# Patient Record
Sex: FEMALE | URBAN - METROPOLITAN AREA
[De-identification: names, ages, dates, MRNs, and addresses within clinical notes are randomized per-mention and may not be internally consistent; named-entity substitution may affect disease eponyms.]

---

## 2021-04-19 ENCOUNTER — APPOINTMENT (RX ONLY)
Dept: URBAN - METROPOLITAN AREA CLINIC 52 | Facility: CLINIC | Age: 61
Setting detail: DERMATOLOGY
End: 2021-04-19

## 2021-04-19 VITALS — TEMPERATURE: 95.1 F

## 2021-04-19 DIAGNOSIS — D485 NEOPLASM OF UNCERTAIN BEHAVIOR OF SKIN: ICD-10-CM | Status: INADEQUATELY CONTROLLED

## 2021-04-19 DIAGNOSIS — L82.1 OTHER SEBORRHEIC KERATOSIS: ICD-10-CM

## 2021-04-19 PROBLEM — D48.5 NEOPLASM OF UNCERTAIN BEHAVIOR OF SKIN: Status: ACTIVE | Noted: 2021-04-19

## 2021-04-19 PROCEDURE — ? ADDITIONAL NOTES

## 2021-04-19 PROCEDURE — 11103 TANGNTL BX SKIN EA SEP/ADDL: CPT

## 2021-04-19 PROCEDURE — 11102 TANGNTL BX SKIN SINGLE LES: CPT

## 2021-04-19 PROCEDURE — 99202 OFFICE O/P NEW SF 15 MIN: CPT | Mod: 25

## 2021-04-19 PROCEDURE — ? BIOPSY BY SHAVE METHOD

## 2021-04-19 PROCEDURE — ? COUNSELING

## 2021-04-19 ASSESSMENT — LOCATION DETAILED DESCRIPTION DERM
LOCATION DETAILED: RIGHT SUPERIOR LATERAL MALAR CHEEK
LOCATION DETAILED: LEFT SUPERIOR MEDIAL FOREHEAD
LOCATION DETAILED: LEFT LATERAL DISTAL PRETIBIAL REGION
LOCATION DETAILED: RIGHT CENTRAL MALAR CHEEK

## 2021-04-19 ASSESSMENT — LOCATION SIMPLE DESCRIPTION DERM
LOCATION SIMPLE: LEFT PRETIBIAL REGION
LOCATION SIMPLE: RIGHT CHEEK
LOCATION SIMPLE: LEFT FOREHEAD

## 2021-04-19 ASSESSMENT — LOCATION ZONE DERM
LOCATION ZONE: FACE
LOCATION ZONE: LEG

## 2021-04-19 NOTE — PROCEDURE: BIOPSY BY SHAVE METHOD
Detail Level: Detailed
Depth Of Biopsy: dermis
Was A Bandage Applied: Yes
Size Of Lesion In Cm: 0.8
X Size Of Lesion In Cm: 0
Biopsy Type: H and E
Biopsy Method: Dermablade
Anesthesia Type: 1% lidocaine with epinephrine
Anesthesia Volume In Cc (Will Not Render If 0): 0.5
Hemostasis: Drysol
Wound Care: Petrolatum
Dressing: bandage
Destruction After The Procedure: No
Type Of Destruction Used: Curettage
Curettage Text: The wound bed was treated with curettage after the biopsy was performed.
Cryotherapy Text: The wound bed was treated with cryotherapy after the biopsy was performed.
Electrodesiccation Text: The wound bed was treated with electrodesiccation after the biopsy was performed.
Electrodesiccation And Curettage Text: The wound bed was treated with electrodesiccation and curettage after the biopsy was performed.
Silver Nitrate Text: The wound bed was treated with silver nitrate after the biopsy was performed.
Lab: -7
Lab Facility: [id number]
Consent: Written consent was obtained and risks were reviewed including but not limited to scarring, infection, bleeding, scabbing, incomplete removal, nerve damage and allergy to anesthesia.
Post-Care Instructions: I reviewed with the patient in detail post-care instructions. Patient is to keep the biopsy site dry overnight, and then apply bacitracin twice daily until healed. Patient may apply hydrogen peroxide soaks to remove any crusting.
Notification Instructions: Patient will be notified of biopsy results. However, patient instructed to call the office if not contacted within 2 weeks.
Billing Type: Third-Party Bill
Information: Selecting Yes will display possible errors in your note based on the variables you have selected. This validation is only offered as a suggestion for you. PLEASE NOTE THAT THE VALIDATION TEXT WILL BE REMOVED WHEN YOU FINALIZE YOUR NOTE. IF YOU WANT TO FAX A PRELIMINARY NOTE YOU WILL NEED TO TOGGLE THIS TO 'NO' IF YOU DO NOT WANT IT IN YOUR FAXED NOTE.
Size Of Lesion In Cm: 1

## 2021-05-17 ENCOUNTER — APPOINTMENT (RX ONLY)
Dept: URBAN - METROPOLITAN AREA CLINIC 52 | Facility: CLINIC | Age: 61
Setting detail: DERMATOLOGY
End: 2021-05-17

## 2021-05-17 VITALS — TEMPERATURE: 97.4 F

## 2021-05-17 DIAGNOSIS — D18.0 HEMANGIOMA: ICD-10-CM | Status: STABLE

## 2021-05-17 DIAGNOSIS — D485 NEOPLASM OF UNCERTAIN BEHAVIOR OF SKIN: ICD-10-CM | Status: INADEQUATELY CONTROLLED

## 2021-05-17 DIAGNOSIS — D22 MELANOCYTIC NEVI: ICD-10-CM | Status: STABLE

## 2021-05-17 DIAGNOSIS — L82.1 OTHER SEBORRHEIC KERATOSIS: ICD-10-CM

## 2021-05-17 DIAGNOSIS — L81.4 OTHER MELANIN HYPERPIGMENTATION: ICD-10-CM | Status: STABLE

## 2021-05-17 PROBLEM — D48.5 NEOPLASM OF UNCERTAIN BEHAVIOR OF SKIN: Status: ACTIVE | Noted: 2021-05-17

## 2021-05-17 PROBLEM — D22.5 MELANOCYTIC NEVI OF TRUNK: Status: ACTIVE | Noted: 2021-05-17

## 2021-05-17 PROBLEM — D18.01 HEMANGIOMA OF SKIN AND SUBCUTANEOUS TISSUE: Status: ACTIVE | Noted: 2021-05-17

## 2021-05-17 PROCEDURE — ? COUNSELING

## 2021-05-17 PROCEDURE — ? BIOPSY BY SHAVE METHOD

## 2021-05-17 PROCEDURE — 99212 OFFICE O/P EST SF 10 MIN: CPT | Mod: 25

## 2021-05-17 PROCEDURE — 11102 TANGNTL BX SKIN SINGLE LES: CPT

## 2021-05-17 PROCEDURE — ? ADDITIONAL NOTES

## 2021-05-17 PROCEDURE — 11103 TANGNTL BX SKIN EA SEP/ADDL: CPT

## 2021-05-17 ASSESSMENT — LOCATION DETAILED DESCRIPTION DERM
LOCATION DETAILED: LEFT LATERAL ABDOMEN
LOCATION DETAILED: LEFT MEDIAL UPPER BACK
LOCATION DETAILED: RIGHT MEDIAL BREAST 2-3:00 REGION
LOCATION DETAILED: LOWER STERNUM
LOCATION DETAILED: LEFT ANTERIOR DISTAL THIGH
LOCATION DETAILED: POSTERIOR MID-PARIETAL SCALP
LOCATION DETAILED: SUBXIPHOID
LOCATION DETAILED: EPIGASTRIC SKIN

## 2021-05-17 ASSESSMENT — LOCATION ZONE DERM
LOCATION ZONE: LEG
LOCATION ZONE: TRUNK
LOCATION ZONE: SCALP

## 2021-05-17 ASSESSMENT — LOCATION SIMPLE DESCRIPTION DERM
LOCATION SIMPLE: ABDOMEN
LOCATION SIMPLE: LEFT THIGH
LOCATION SIMPLE: POSTERIOR SCALP
LOCATION SIMPLE: RIGHT BREAST
LOCATION SIMPLE: LEFT UPPER BACK
LOCATION SIMPLE: CHEST

## 2021-05-17 NOTE — PROCEDURE: BIOPSY BY SHAVE METHOD
Detail Level: Detailed
Depth Of Biopsy: dermis
Was A Bandage Applied: Yes
Size Of Lesion In Cm: 0.7
X Size Of Lesion In Cm: 0
Biopsy Type: H and E
Biopsy Method: Dermablade
Anesthesia Type: 1% lidocaine with epinephrine
Anesthesia Volume In Cc (Will Not Render If 0): 0.5
Hemostasis: Drysol
Wound Care: Petrolatum
Dressing: bandage
Destruction After The Procedure: No
Type Of Destruction Used: Curettage
Curettage Text: The wound bed was treated with curettage after the biopsy was performed.
Cryotherapy Text: The wound bed was treated with cryotherapy after the biopsy was performed.
Electrodesiccation Text: The wound bed was treated with electrodesiccation after the biopsy was performed.
Electrodesiccation And Curettage Text: The wound bed was treated with electrodesiccation and curettage after the biopsy was performed.
Silver Nitrate Text: The wound bed was treated with silver nitrate after the biopsy was performed.
Lab: -7
Lab Facility: [id number]
Consent: Written consent was obtained and risks were reviewed including but not limited to scarring, infection, bleeding, scabbing, incomplete removal, nerve damage and allergy to anesthesia.
Post-Care Instructions: I reviewed with the patient in detail post-care instructions. Patient is to keep the biopsy site dry overnight, and then apply bacitracin twice daily until healed. Patient may apply hydrogen peroxide soaks to remove any crusting.
Notification Instructions: Patient will be notified of biopsy results. However, patient instructed to call the office if not contacted within 2 weeks.
Billing Type: Third-Party Bill
Information: Selecting Yes will display possible errors in your note based on the variables you have selected. This validation is only offered as a suggestion for you. PLEASE NOTE THAT THE VALIDATION TEXT WILL BE REMOVED WHEN YOU FINALIZE YOUR NOTE. IF YOU WANT TO FAX A PRELIMINARY NOTE YOU WILL NEED TO TOGGLE THIS TO 'NO' IF YOU DO NOT WANT IT IN YOUR FAXED NOTE.
Size Of Lesion In Cm: 0.6

## 2021-06-28 ENCOUNTER — APPOINTMENT (RX ONLY)
Dept: URBAN - METROPOLITAN AREA CLINIC 61 | Facility: CLINIC | Age: 61
Setting detail: DERMATOLOGY
End: 2021-06-28

## 2021-06-28 VITALS — DIASTOLIC BLOOD PRESSURE: 83 MMHG | HEART RATE: 81 BPM | SYSTOLIC BLOOD PRESSURE: 158 MMHG

## 2021-06-28 PROBLEM — C44.329 SQUAMOUS CELL CARCINOMA OF SKIN OF OTHER PARTS OF FACE: Status: ACTIVE | Noted: 2021-06-28

## 2021-06-28 PROBLEM — C44.42 SQUAMOUS CELL CARCINOMA OF SKIN OF SCALP AND NECK: Status: ACTIVE | Noted: 2021-06-28

## 2021-06-28 PROCEDURE — 17311 MOHS 1 STAGE H/N/HF/G: CPT

## 2021-06-28 PROCEDURE — ? REPAIR NOTE

## 2021-06-28 PROCEDURE — ? PRESCRIPTION

## 2021-06-28 PROCEDURE — ? MOHS SURGERY

## 2021-06-28 PROCEDURE — 13121 CMPLX RPR S/A/L 2.6-7.5 CM: CPT

## 2021-06-28 RX ORDER — DOXYCYCLINE 100 MG/1
CAPSULE ORAL BID
Qty: 10 | Refills: 0 | Status: ERX | COMMUNITY
Start: 2021-06-28

## 2021-06-28 RX ADMIN — DOXYCYCLINE 1: 100 CAPSULE ORAL at 00:00

## 2021-07-06 ENCOUNTER — APPOINTMENT (RX ONLY)
Dept: URBAN - METROPOLITAN AREA CLINIC 52 | Facility: CLINIC | Age: 61
Setting detail: DERMATOLOGY
End: 2021-07-06

## 2021-07-06 DIAGNOSIS — D22 MELANOCYTIC NEVI: ICD-10-CM

## 2021-07-06 PROBLEM — D48.5 NEOPLASM OF UNCERTAIN BEHAVIOR OF SKIN: Status: ACTIVE | Noted: 2021-07-06

## 2021-07-06 PROCEDURE — ? EXCISION

## 2021-07-06 PROCEDURE — 12032 INTMD RPR S/A/T/EXT 2.6-7.5: CPT | Mod: 79

## 2021-07-06 PROCEDURE — 11402 EXC TR-EXT B9+MARG 1.1-2 CM: CPT | Mod: 79

## 2021-07-06 PROCEDURE — ? ADDITIONAL NOTES

## 2021-07-06 ASSESSMENT — LOCATION DETAILED DESCRIPTION DERM: LOCATION DETAILED: LEFT LATERAL ABDOMEN

## 2021-07-06 ASSESSMENT — LOCATION ZONE DERM: LOCATION ZONE: TRUNK

## 2021-07-06 ASSESSMENT — LOCATION SIMPLE DESCRIPTION DERM: LOCATION SIMPLE: ABDOMEN

## 2021-07-08 ENCOUNTER — APPOINTMENT (RX ONLY)
Dept: URBAN - METROPOLITAN AREA CLINIC 52 | Facility: CLINIC | Age: 61
Setting detail: DERMATOLOGY
End: 2021-07-08

## 2021-07-08 DIAGNOSIS — D22 MELANOCYTIC NEVI: ICD-10-CM

## 2021-07-08 PROBLEM — D48.5 NEOPLASM OF UNCERTAIN BEHAVIOR OF SKIN: Status: ACTIVE | Noted: 2021-07-08

## 2021-07-08 PROCEDURE — ? EXCISION

## 2021-07-08 PROCEDURE — 11402 EXC TR-EXT B9+MARG 1.1-2 CM: CPT | Mod: 79

## 2021-07-08 PROCEDURE — 12032 INTMD RPR S/A/T/EXT 2.6-7.5: CPT | Mod: 79

## 2021-07-08 ASSESSMENT — LOCATION SIMPLE DESCRIPTION DERM: LOCATION SIMPLE: ABDOMEN

## 2021-07-08 ASSESSMENT — LOCATION ZONE DERM: LOCATION ZONE: TRUNK

## 2021-07-08 ASSESSMENT — LOCATION DETAILED DESCRIPTION DERM: LOCATION DETAILED: EPIGASTRIC SKIN

## 2021-07-12 ENCOUNTER — APPOINTMENT (RX ONLY)
Dept: URBAN - METROPOLITAN AREA CLINIC 52 | Facility: CLINIC | Age: 61
Setting detail: DERMATOLOGY
End: 2021-07-12

## 2021-07-12 DIAGNOSIS — D22 MELANOCYTIC NEVI: ICD-10-CM

## 2021-07-12 PROBLEM — D22.72 MELANOCYTIC NEVI OF LEFT LOWER LIMB, INCLUDING HIP: Status: ACTIVE | Noted: 2021-07-12

## 2021-07-12 PROCEDURE — 11402 EXC TR-EXT B9+MARG 1.1-2 CM: CPT | Mod: 79

## 2021-07-12 PROCEDURE — ? EXCISION

## 2021-07-12 PROCEDURE — 12032 INTMD RPR S/A/T/EXT 2.6-7.5: CPT | Mod: 79

## 2021-07-12 ASSESSMENT — LOCATION SIMPLE DESCRIPTION DERM: LOCATION SIMPLE: LEFT THIGH

## 2021-07-12 ASSESSMENT — LOCATION ZONE DERM: LOCATION ZONE: LEG

## 2021-07-12 ASSESSMENT — LOCATION DETAILED DESCRIPTION DERM: LOCATION DETAILED: LEFT ANTERIOR DISTAL THIGH

## 2021-07-12 NOTE — PROCEDURE: EXCISION
Authored by Resident/PA/NP Complex Repair And Rhombic Flap Text: The defect edges were debeveled with a #15 scalpel blade.  The primary defect was closed partially with a complex linear closure.  Given the location of the remaining defect, shape of the defect and the proximity to free margins a rhombic flap was deemed most appropriate for complete closure of the defect.  Using a sterile surgical marker, an appropriate advancement flap was drawn incorporating the defect and placing the expected incisions within the relaxed skin tension lines where possible.    The area thus outlined was incised deep to adipose tissue with a #15 scalpel blade.  The skin margins were undermined to an appropriate distance in all directions utilizing iris scissors.

## 2021-07-15 ENCOUNTER — APPOINTMENT (RX ONLY)
Dept: URBAN - METROPOLITAN AREA CLINIC 52 | Facility: CLINIC | Age: 61
Setting detail: DERMATOLOGY
End: 2021-07-15

## 2021-07-15 DIAGNOSIS — D22 MELANOCYTIC NEVI: ICD-10-CM

## 2021-07-15 PROBLEM — D48.5 NEOPLASM OF UNCERTAIN BEHAVIOR OF SKIN: Status: ACTIVE | Noted: 2021-07-15

## 2021-07-15 PROCEDURE — 11402 EXC TR-EXT B9+MARG 1.1-2 CM: CPT | Mod: 79

## 2021-07-15 PROCEDURE — 12032 INTMD RPR S/A/T/EXT 2.6-7.5: CPT | Mod: 79

## 2021-07-15 PROCEDURE — ? EXCISION

## 2021-07-15 PROCEDURE — ? ADDITIONAL NOTES

## 2021-07-15 ASSESSMENT — LOCATION ZONE DERM: LOCATION ZONE: TRUNK

## 2021-07-15 ASSESSMENT — LOCATION DETAILED DESCRIPTION DERM: LOCATION DETAILED: LEFT MEDIAL UPPER BACK

## 2021-07-15 ASSESSMENT — LOCATION SIMPLE DESCRIPTION DERM: LOCATION SIMPLE: LEFT UPPER BACK

## 2021-07-15 NOTE — PROCEDURE: EXCISION
You can access the FollowMyHealth Patient Portal offered by [facility] by registering at the following website: [email or web address]. By joining [facility]’s FollowMyHealth portal, you will also be able to view your health information using other applications (apps) compatible with our system. Muscle Hinge Flap Text: The defect edges were debeveled with a #15 scalpel blade.  Given the size, depth and location of the defect and the proximity to free margins a muscle hinge flap was deemed most appropriate.  Using a sterile surgical marker, an appropriate hinge flap was drawn incorporating the defect. The area thus outlined was incised with a #15 scalpel blade.  The skin margins were undermined to an appropriate distance in all directions utilizing iris scissors.

## 2021-07-21 ENCOUNTER — APPOINTMENT (RX ONLY)
Dept: URBAN - METROPOLITAN AREA CLINIC 52 | Facility: CLINIC | Age: 61
Setting detail: DERMATOLOGY
End: 2021-07-21

## 2021-07-21 DIAGNOSIS — Z48.02 ENCOUNTER FOR REMOVAL OF SUTURES: ICD-10-CM

## 2021-07-21 PROCEDURE — ? SUTURE REMOVAL (GLOBAL PERIOD)

## 2021-07-21 ASSESSMENT — LOCATION DETAILED DESCRIPTION DERM
LOCATION DETAILED: LEFT LATERAL ABDOMEN
LOCATION DETAILED: EPIGASTRIC SKIN

## 2021-07-21 ASSESSMENT — LOCATION ZONE DERM: LOCATION ZONE: TRUNK

## 2021-07-21 ASSESSMENT — LOCATION SIMPLE DESCRIPTION DERM: LOCATION SIMPLE: ABDOMEN

## 2021-07-21 NOTE — PROCEDURE: SUTURE REMOVAL (GLOBAL PERIOD)
Detail Level: Detailed
Add 86523 Cpt? (Important Note: In 2017 The Use Of 05204 Is Being Tracked By Cms To Determine Future Global Period Reimbursement For Global Periods): no

## 2021-12-01 NOTE — PROCEDURE: EXCISION
5 Hemigard Intro: Due to skin fragility and wound tension, it was decided to use HEMIGARD adhesive retention suture devices to permit a linear closure. The skin was cleaned and dried for a 6cm distance away from the wound. Excessive hair, if present, was removed to allow for adhesion.

## 2022-03-14 ENCOUNTER — APPOINTMENT (RX ONLY)
Dept: URBAN - METROPOLITAN AREA CLINIC 52 | Facility: CLINIC | Age: 62
Setting detail: DERMATOLOGY
End: 2022-03-14

## 2022-03-14 DIAGNOSIS — D485 NEOPLASM OF UNCERTAIN BEHAVIOR OF SKIN: ICD-10-CM

## 2022-03-14 DIAGNOSIS — L82.1 OTHER SEBORRHEIC KERATOSIS: ICD-10-CM | Status: STABLE

## 2022-03-14 DIAGNOSIS — L85.3 XEROSIS CUTIS: ICD-10-CM | Status: STABLE

## 2022-03-14 DIAGNOSIS — L57.8 OTHER SKIN CHANGES DUE TO CHRONIC EXPOSURE TO NONIONIZING RADIATION: ICD-10-CM | Status: STABLE

## 2022-03-14 DIAGNOSIS — Z85.828 PERSONAL HISTORY OF OTHER MALIGNANT NEOPLASM OF SKIN: ICD-10-CM | Status: STABLE

## 2022-03-14 DIAGNOSIS — D18.0 HEMANGIOMA: ICD-10-CM | Status: STABLE

## 2022-03-14 DIAGNOSIS — D22 MELANOCYTIC NEVI: ICD-10-CM | Status: STABLE

## 2022-03-14 DIAGNOSIS — L81.4 OTHER MELANIN HYPERPIGMENTATION: ICD-10-CM | Status: STABLE

## 2022-03-14 PROBLEM — D48.5 NEOPLASM OF UNCERTAIN BEHAVIOR OF SKIN: Status: ACTIVE | Noted: 2022-03-14

## 2022-03-14 PROBLEM — D18.01 HEMANGIOMA OF SKIN AND SUBCUTANEOUS TISSUE: Status: ACTIVE | Noted: 2022-03-14

## 2022-03-14 PROBLEM — D22.5 MELANOCYTIC NEVI OF TRUNK: Status: ACTIVE | Noted: 2022-03-14

## 2022-03-14 PROCEDURE — ? PRESCRIPTION

## 2022-03-14 PROCEDURE — ? SUNSCREEN RECOMMENDATIONS

## 2022-03-14 PROCEDURE — 99214 OFFICE O/P EST MOD 30 MIN: CPT | Mod: 25

## 2022-03-14 PROCEDURE — 11102 TANGNTL BX SKIN SINGLE LES: CPT

## 2022-03-14 PROCEDURE — ? TREATMENT REGIMEN

## 2022-03-14 PROCEDURE — 11103 TANGNTL BX SKIN EA SEP/ADDL: CPT

## 2022-03-14 PROCEDURE — ? SUNSCREEN TREATMENT REGIMEN

## 2022-03-14 PROCEDURE — ? FULL BODY SKIN EXAM

## 2022-03-14 PROCEDURE — ? BIOPSY BY SHAVE METHOD

## 2022-03-14 PROCEDURE — ? ADDITIONAL NOTES

## 2022-03-14 PROCEDURE — ? COUNSELING

## 2022-03-14 RX ORDER — MUPIROCIN 20 MG/G
OINTMENT TOPICAL BID
Qty: 22 | Refills: 0 | Status: ERX | COMMUNITY
Start: 2022-03-14

## 2022-03-14 RX ADMIN — MUPIROCIN 1: 20 OINTMENT TOPICAL at 00:00

## 2022-03-14 ASSESSMENT — LOCATION SIMPLE DESCRIPTION DERM
LOCATION SIMPLE: RIGHT PRETIBIAL REGION
LOCATION SIMPLE: RIGHT POSTERIOR UPPER ARM
LOCATION SIMPLE: RIGHT FOREHEAD
LOCATION SIMPLE: RIGHT UPPER BACK
LOCATION SIMPLE: UPPER BACK
LOCATION SIMPLE: LEFT PRETIBIAL REGION
LOCATION SIMPLE: LEFT LOWER BACK
LOCATION SIMPLE: ABDOMEN

## 2022-03-14 ASSESSMENT — LOCATION DETAILED DESCRIPTION DERM
LOCATION DETAILED: SUPERIOR THORACIC SPINE
LOCATION DETAILED: EPIGASTRIC SKIN
LOCATION DETAILED: LEFT PROXIMAL PRETIBIAL REGION
LOCATION DETAILED: LEFT INFERIOR LATERAL MIDBACK
LOCATION DETAILED: RIGHT SUPERIOR FOREHEAD
LOCATION DETAILED: RIGHT PROXIMAL PRETIBIAL REGION
LOCATION DETAILED: RIGHT MID-UPPER BACK
LOCATION DETAILED: RIGHT SUPERIOR UPPER BACK
LOCATION DETAILED: RIGHT PROXIMAL POSTERIOR UPPER ARM

## 2022-03-14 ASSESSMENT — LOCATION ZONE DERM
LOCATION ZONE: LEG
LOCATION ZONE: TRUNK
LOCATION ZONE: FACE
LOCATION ZONE: ARM

## 2022-03-14 NOTE — PROCEDURE: BIOPSY BY SHAVE METHOD
Detail Level: Detailed
Depth Of Biopsy: dermis
Was A Bandage Applied: Yes
Size Of Lesion In Cm: 1.1
X Size Of Lesion In Cm: 0
Biopsy Type: H and E
Biopsy Method: Personna blade
Anesthesia Type: 1% lidocaine with epinephrine
Anesthesia Volume In Cc (Will Not Render If 0): 0.5
Hemostasis: Krystal's
Wound Care: Petrolatum
Dressing: bandage
Destruction After The Procedure: No
Type Of Destruction Used: Curettage
Curettage Text: The wound bed was treated with curettage after the biopsy was performed.
Cryotherapy Text: The wound bed was treated with cryotherapy after the biopsy was performed.
Electrodesiccation Text: The wound bed was treated with electrodesiccation after the biopsy was performed.
Electrodesiccation And Curettage Text: The wound bed was treated with electrodesiccation and curettage after the biopsy was performed.
Silver Nitrate Text: The wound bed was treated with silver nitrate after the biopsy was performed.
Lab: -7
Lab Facility: [id number]
Consent: Written consent was obtained and risks were reviewed including but not limited to scarring, infection, bleeding, scabbing, incomplete removal, nerve damage and allergy to anesthesia.
Post-Care Instructions: I reviewed with the patient in detail post-care instructions. Patient to keep bandaid on for 24 hours. Then remove, wash with soap and water and apply vaseline twice daily until healed.
Notification Instructions: Patient will be notified of biopsy results. However, patient instructed to call the office if not contacted within 2 weeks.
Billing Type: Third-Party Bill
Information: Selecting Yes will display possible errors in your note based on the variables you have selected. This validation is only offered as a suggestion for you. PLEASE NOTE THAT THE VALIDATION TEXT WILL BE REMOVED WHEN YOU FINALIZE YOUR NOTE. IF YOU WANT TO FAX A PRELIMINARY NOTE YOU WILL NEED TO TOGGLE THIS TO 'NO' IF YOU DO NOT WANT IT IN YOUR FAXED NOTE.
Size Of Lesion In Cm: 0.6

## 2022-03-22 NOTE — PROCEDURE: EXCISION
Island Pedicle Flap With Canthal Suspension Text: The defect edges were debeveled with a #15 scalpel blade.  Given the location of the defect, shape of the defect and the proximity to free margins an island pedicle advancement flap was deemed most appropriate.  Using a sterile surgical marker, an appropriate advancement flap was drawn incorporating the defect, outlining the appropriate donor tissue and placing the expected incisions within the relaxed skin tension lines where possible. The area thus outlined was incised deep to adipose tissue with a #15 scalpel blade.  The skin margins were undermined to an appropriate distance in all directions around the primary defect and laterally outward around the island pedicle utilizing iris scissors.  There was minimal undermining beneath the pedicle flap. A suspension suture was placed in the canthal tendon to prevent tension and prevent ectropion. Hydroxyzine Counseling: Patient advised that the medication is sedating and not to drive a car after taking this medication.  Patient informed of potential adverse effects including but not limited to dry mouth, urinary retention, and blurry vision.  The patient verbalized understanding of the proper use and possible adverse effects of hydroxyzine.  All of the patient's questions and concerns were addressed.

## 2022-04-12 ENCOUNTER — APPOINTMENT (RX ONLY)
Dept: URBAN - METROPOLITAN AREA CLINIC 52 | Facility: CLINIC | Age: 62
Setting detail: DERMATOLOGY
End: 2022-04-12

## 2022-04-12 PROBLEM — C43.59 MALIGNANT MELANOMA OF OTHER PART OF TRUNK: Status: ACTIVE | Noted: 2022-04-12

## 2022-04-12 PROCEDURE — ? EXCISION

## 2022-04-12 PROCEDURE — 13101 CMPLX RPR TRUNK 2.6-7.5 CM: CPT

## 2022-04-12 PROCEDURE — 11603 EXC TR-EXT MAL+MARG 2.1-3 CM: CPT

## 2022-04-12 PROCEDURE — ? ADDITIONAL NOTES

## 2022-04-12 PROCEDURE — 13102 CMPLX RPR TRUNK ADDL 5CM/<: CPT

## 2022-04-18 ENCOUNTER — APPOINTMENT (RX ONLY)
Dept: URBAN - METROPOLITAN AREA CLINIC 52 | Facility: CLINIC | Age: 62
Setting detail: DERMATOLOGY
End: 2022-04-18

## 2022-04-18 PROBLEM — C44.612 BASAL CELL CARCINOMA OF SKIN OF RIGHT UPPER LIMB, INCLUDING SHOULDER: Status: ACTIVE | Noted: 2022-04-18

## 2022-04-18 PROCEDURE — 13121 CMPLX RPR S/A/L 2.6-7.5 CM: CPT | Mod: 79

## 2022-04-18 PROCEDURE — ? EXCISION

## 2022-04-18 PROCEDURE — 11602 EXC TR-EXT MAL+MARG 1.1-2 CM: CPT | Mod: 79

## 2022-04-18 NOTE — PROCEDURE: EXCISION
Recommended weight and BMI control through healthy diet and exercise, green leafy veggies, UV protection, and not smoking. Reviewed the importance of daily monitoring of the vision in each eye independently, along with the use of the Amsler grid daily and instructed patient to call and return immediately for any new changes in their vision or on the Amsler grid. Patient instructed on the importance of regular follow up and monitoring for the early detection of conversion to wet AMD as early detection results in early treatment and better outcomes. Purse String (Simple) Text: Given the location of the defect and the characteristics of the surrounding skin a purse string simple closure was deemed most appropriate.  Undermining was performed circumferentially around the surgical defect.  A purse string suture was then placed and tightened.

## 2022-04-19 ENCOUNTER — APPOINTMENT (RX ONLY)
Dept: URBAN - METROPOLITAN AREA CLINIC 52 | Facility: CLINIC | Age: 62
Setting detail: DERMATOLOGY
End: 2022-04-19

## 2022-04-19 DIAGNOSIS — D485 NEOPLASM OF UNCERTAIN BEHAVIOR OF SKIN: ICD-10-CM

## 2022-04-19 PROBLEM — D48.5 NEOPLASM OF UNCERTAIN BEHAVIOR OF SKIN: Status: ACTIVE | Noted: 2022-04-19

## 2022-04-19 PROCEDURE — 12032 INTMD RPR S/A/T/EXT 2.6-7.5: CPT | Mod: 79

## 2022-04-19 PROCEDURE — ? EXCISION

## 2022-04-19 PROCEDURE — 11402 EXC TR-EXT B9+MARG 1.1-2 CM: CPT | Mod: 79

## 2022-04-19 PROCEDURE — ? ADDITIONAL NOTES

## 2022-04-19 ASSESSMENT — LOCATION DETAILED DESCRIPTION DERM: LOCATION DETAILED: RIGHT SUPERIOR UPPER BACK

## 2022-04-19 ASSESSMENT — LOCATION ZONE DERM: LOCATION ZONE: TRUNK

## 2022-04-19 ASSESSMENT — LOCATION SIMPLE DESCRIPTION DERM: LOCATION SIMPLE: RIGHT UPPER BACK

## 2022-04-19 NOTE — PROCEDURE: EXCISION
5 Tissue Cultured Epidermal Autograft Text: The defect edges were debeveled with a #15 scalpel blade.  Given the location of the defect, shape of the defect and the proximity to free margins a tissue cultured epidermal autograft was deemed most appropriate.  The graft was then trimmed to fit the size of the defect.  The graft was then placed in the primary defect and oriented appropriately.

## 2022-04-19 NOTE — PROCEDURE: EXCISION
Body Location Override (Optional - Billing Will Still Be Based On Selected Body Map Location If Applicable): Right mid upper back

## 2022-07-19 ENCOUNTER — APPOINTMENT (RX ONLY)
Dept: URBAN - METROPOLITAN AREA CLINIC 52 | Facility: CLINIC | Age: 62
Setting detail: DERMATOLOGY
End: 2022-07-19

## 2022-07-19 DIAGNOSIS — L82.1 OTHER SEBORRHEIC KERATOSIS: ICD-10-CM | Status: STABLE

## 2022-07-19 DIAGNOSIS — L57.0 ACTINIC KERATOSIS: ICD-10-CM

## 2022-07-19 DIAGNOSIS — D485 NEOPLASM OF UNCERTAIN BEHAVIOR OF SKIN: ICD-10-CM | Status: INADEQUATELY CONTROLLED

## 2022-07-19 DIAGNOSIS — D18.0 HEMANGIOMA: ICD-10-CM | Status: STABLE

## 2022-07-19 DIAGNOSIS — L57.8 OTHER SKIN CHANGES DUE TO CHRONIC EXPOSURE TO NONIONIZING RADIATION: ICD-10-CM | Status: STABLE

## 2022-07-19 DIAGNOSIS — L81.4 OTHER MELANIN HYPERPIGMENTATION: ICD-10-CM | Status: STABLE

## 2022-07-19 DIAGNOSIS — D22 MELANOCYTIC NEVI: ICD-10-CM | Status: STABLE

## 2022-07-19 DIAGNOSIS — Z85.820 PERSONAL HISTORY OF MALIGNANT MELANOMA OF SKIN: ICD-10-CM | Status: STABLE

## 2022-07-19 DIAGNOSIS — L85.3 XEROSIS CUTIS: ICD-10-CM | Status: INADEQUATELY CONTROLLED

## 2022-07-19 PROBLEM — D48.5 NEOPLASM OF UNCERTAIN BEHAVIOR OF SKIN: Status: ACTIVE | Noted: 2022-07-19

## 2022-07-19 PROBLEM — D22.5 MELANOCYTIC NEVI OF TRUNK: Status: ACTIVE | Noted: 2022-07-19

## 2022-07-19 PROBLEM — D18.01 HEMANGIOMA OF SKIN AND SUBCUTANEOUS TISSUE: Status: ACTIVE | Noted: 2022-07-19

## 2022-07-19 PROCEDURE — ? LIQUID NITROGEN

## 2022-07-19 PROCEDURE — 17000 DESTRUCT PREMALG LESION: CPT | Mod: 59

## 2022-07-19 PROCEDURE — 11102 TANGNTL BX SKIN SINGLE LES: CPT

## 2022-07-19 PROCEDURE — 99214 OFFICE O/P EST MOD 30 MIN: CPT | Mod: 25

## 2022-07-19 PROCEDURE — ? COUNSELING

## 2022-07-19 PROCEDURE — ? TREATMENT REGIMEN

## 2022-07-19 PROCEDURE — 11103 TANGNTL BX SKIN EA SEP/ADDL: CPT

## 2022-07-19 PROCEDURE — ? BIOPSY BY SHAVE METHOD

## 2022-07-19 PROCEDURE — 17003 DESTRUCT PREMALG LES 2-14: CPT

## 2022-07-19 PROCEDURE — ? PRESCRIPTION MEDICATION MANAGEMENT

## 2022-07-19 PROCEDURE — ? FULL BODY SKIN EXAM

## 2022-07-19 PROCEDURE — ? PRESCRIPTION

## 2022-07-19 RX ORDER — MUPIROCIN 20 MG/G
OINTMENT TOPICAL BID
Qty: 22 | Refills: 0 | Status: ERX

## 2022-07-19 ASSESSMENT — LOCATION DETAILED DESCRIPTION DERM
LOCATION DETAILED: LEFT SUPERIOR MEDIAL UPPER BACK
LOCATION DETAILED: SUPERIOR THORACIC SPINE
LOCATION DETAILED: LEFT ANTERIOR SHOULDER
LOCATION DETAILED: LEFT MEDIAL FOREHEAD
LOCATION DETAILED: LEFT CENTRAL MALAR CHEEK

## 2022-07-19 ASSESSMENT — LOCATION SIMPLE DESCRIPTION DERM
LOCATION SIMPLE: LEFT CHEEK
LOCATION SIMPLE: LEFT SHOULDER
LOCATION SIMPLE: UPPER BACK
LOCATION SIMPLE: LEFT FOREHEAD
LOCATION SIMPLE: LEFT UPPER BACK

## 2022-07-19 ASSESSMENT — LOCATION ZONE DERM
LOCATION ZONE: FACE
LOCATION ZONE: TRUNK
LOCATION ZONE: ARM

## 2022-07-19 NOTE — PROCEDURE: PRESCRIPTION MEDICATION MANAGEMENT
Plan: Discussed ammonium lactate 12% lotion QD prescription.
Detail Level: Zone
Render In Strict Bullet Format?: No

## 2022-07-19 NOTE — PROCEDURE: BIOPSY BY SHAVE METHOD
Detail Level: Detailed
Depth Of Biopsy: dermis
Was A Bandage Applied: Yes
Size Of Lesion In Cm: 0
X Size Of Lesion In Cm: 0.4
Biopsy Type: H and E
Biopsy Method: Personna blade
Anesthesia Type: 1% lidocaine with epinephrine
Anesthesia Volume In Cc (Will Not Render If 0): 0.5
Hemostasis: Krystal's
Wound Care: Petrolatum
Dressing: bandage
Destruction After The Procedure: No
Type Of Destruction Used: Curettage
Curettage Text: The wound bed was treated with curettage after the biopsy was performed.
Cryotherapy Text: The wound bed was treated with cryotherapy after the biopsy was performed.
Electrodesiccation Text: The wound bed was treated with electrodesiccation after the biopsy was performed.
Electrodesiccation And Curettage Text: The wound bed was treated with electrodesiccation and curettage after the biopsy was performed.
Silver Nitrate Text: The wound bed was treated with silver nitrate after the biopsy was performed.
Lab: -7
Lab Facility: 3
Consent: Written consent was obtained and risks were reviewed including but not limited to scarring, infection, bleeding, scabbing, incomplete removal, nerve damage and allergy to anesthesia.
Post-Care Instructions: I reviewed with the patient in detail post-care instructions. Patient to keep bandaid on for 24 hours. Then remove, wash with soap and water and apply vaseline twice daily until healed.
Notification Instructions: Patient will be notified of biopsy results. However, patient instructed to call the office if not contacted within 2 weeks.
Billing Type: Third-Party Bill
Information: Selecting Yes will display possible errors in your note based on the variables you have selected. This validation is only offered as a suggestion for you. PLEASE NOTE THAT THE VALIDATION TEXT WILL BE REMOVED WHEN YOU FINALIZE YOUR NOTE. IF YOU WANT TO FAX A PRELIMINARY NOTE YOU WILL NEED TO TOGGLE THIS TO 'NO' IF YOU DO NOT WANT IT IN YOUR FAXED NOTE.
X Size Of Lesion In Cm: 0.6

## 2022-07-19 NOTE — PROCEDURE: LIQUID NITROGEN
Consent: The patient's consent was obtained including but not limited to risks of crusting, scabbing, blistering, scarring, darker or lighter pigmentary change, recurrence, incomplete removal and infection.
Show Aperture Variable?: Yes
Detail Level: Zone
Duration Of Freeze Thaw-Cycle (Seconds): 3
Post-Care Instructions: I reviewed with the patient in detail post-care instructions. Patient is to wear sunprotection, and avoid picking at any of the treated lesions. Pt may apply Vaseline to crusted or scabbing areas.
Number Of Freeze-Thaw Cycles: 3 freeze-thaw cycles
Render Post-Care Instructions In Note?: no

## 2022-07-20 NOTE — PROCEDURE: EXCISION
Niacinamide Pregnancy And Lactation Text: These medications are considered safe during pregnancy. Peng Advancement Flap Text: The defect edges were debeveled with a #15 scalpel blade.  Given the location of the defect, shape of the defect and the proximity to free margins a Peng advancement flap was deemed most appropriate.  Using a sterile surgical marker, an appropriate advancement flap was drawn incorporating the defect and placing the expected incisions within the relaxed skin tension lines where possible. The area thus outlined was incised deep to adipose tissue with a #15 scalpel blade.  The skin margins were undermined to an appropriate distance in all directions utilizing iris scissors.

## 2022-08-03 NOTE — PROCEDURE: MOHS SURGERY
Patient presently talking to the phone with a family member from the phone of male who is visiting the patient       Leona Raygoza RN  08/02/22 [id number] Paramedian Forehead Flap Text: A decision was made to reconstruct the defect utilizing an interpolation axial flap and a staged reconstruction.  A telfa template was made of the defect.  This telfa template was then used to outline the paramedian forehead pedicle flap.  The donor area for the pedicle flap was then injected with anesthesia.  The flap was excised through the skin and subcutaneous tissue down to the layer of the underlying musculature.  The pedicle flap was carefully excised within this deep plane to maintain its blood supply.  The edges of the donor site were undermined.   The donor site was closed in a primary fashion.  The pedicle was then rotated into position and sutured.  Once the tube was sutured into place, adequate blood supply was confirmed with blanching and refill.  The pedicle was then wrapped with xeroform gauze and dressed appropriately with a telfa and gauze bandage to ensure continued blood supply and protect the attached pedicle.

## 2022-08-23 ENCOUNTER — APPOINTMENT (RX ONLY)
Dept: URBAN - METROPOLITAN AREA CLINIC 52 | Facility: CLINIC | Age: 62
Setting detail: DERMATOLOGY
End: 2022-08-23

## 2022-08-23 DIAGNOSIS — D22 MELANOCYTIC NEVI: ICD-10-CM

## 2022-08-23 PROBLEM — D22.62 MELANOCYTIC NEVI OF LEFT UPPER LIMB, INCLUDING SHOULDER: Status: ACTIVE | Noted: 2022-08-23

## 2022-08-23 PROCEDURE — 13121 CMPLX RPR S/A/L 2.6-7.5 CM: CPT

## 2022-08-23 PROCEDURE — ? ADDITIONAL NOTES

## 2022-08-23 PROCEDURE — 11402 EXC TR-EXT B9+MARG 1.1-2 CM: CPT

## 2022-08-23 PROCEDURE — ? EXCISION

## 2022-08-23 ASSESSMENT — LOCATION ZONE DERM: LOCATION ZONE: ARM

## 2022-08-23 ASSESSMENT — LOCATION DETAILED DESCRIPTION DERM: LOCATION DETAILED: LEFT ANTERIOR SHOULDER

## 2022-08-23 ASSESSMENT — LOCATION SIMPLE DESCRIPTION DERM: LOCATION SIMPLE: LEFT SHOULDER

## 2022-08-23 NOTE — PROCEDURE: EXCISION
Home No Repair - Repaired With Adjacent Surgical Defect Text (Leave Blank If You Do Not Want): After the excision the defect was repaired concurrently with another surgical defect which was in close approximation.

## 2022-08-23 NOTE — PROCEDURE: EXCISION
Social Work/Discharge Planning:  Met with patient and completed initial assessment. Explained Social Work role and discussed transition of care/discharge planning. She lives with her significant other in a two story house but she stays on the first floor of her home. PTA she uses a cane. She has a glucometer and shower chair. She denies any history with c or snf. Patient is seen by NP at [facility] and pharmacy is [facility]. She plans to return home at discharge and denies any need at this time. Will continue to follow and assist with discharge planning.  Electronically signed by PRIETO Sutton on 7/22/2022 at 10:00 AM Estlander Flap (Upper To Lower Lip) Text: The defect of the lower lip was assessed and measured.  Given the location and size of the defect, an Estlander flap was deemed most appropriate.  Using a sterile surgical marker, an appropriate Estlander flap was measured and drawn on the upper lip. Local anesthesia was then infiltrated. A scalpel was then used to incise the lateral aspect of the flap, through skin, muscle and mucosa, leaving the flap pedicled medially.  The flap was then rotated and positioned to fill the lower lip defect.  The flap was then sutured into place with a three layer technique, closing the orbicularis oris muscle layer with subcutaneous buried sutures, followed by a mucosal layer and an epidermal layer.

## 2022-08-29 ENCOUNTER — APPOINTMENT (RX ONLY)
Dept: URBAN - METROPOLITAN AREA CLINIC 52 | Facility: CLINIC | Age: 62
Setting detail: DERMATOLOGY
End: 2022-08-29

## 2022-08-29 DIAGNOSIS — D22 MELANOCYTIC NEVI: ICD-10-CM

## 2022-08-29 PROBLEM — D22.5 MELANOCYTIC NEVI OF TRUNK: Status: ACTIVE | Noted: 2022-08-29

## 2022-08-29 PROCEDURE — 11402 EXC TR-EXT B9+MARG 1.1-2 CM: CPT | Mod: 79

## 2022-08-29 PROCEDURE — 13101 CMPLX RPR TRUNK 2.6-7.5 CM: CPT | Mod: 79

## 2022-08-29 PROCEDURE — ? EXCISION

## 2022-08-29 ASSESSMENT — LOCATION SIMPLE DESCRIPTION DERM: LOCATION SIMPLE: LEFT UPPER BACK

## 2022-08-29 ASSESSMENT — LOCATION ZONE DERM: LOCATION ZONE: TRUNK

## 2022-08-29 ASSESSMENT — LOCATION DETAILED DESCRIPTION DERM: LOCATION DETAILED: LEFT SUPERIOR MEDIAL UPPER BACK

## 2022-08-29 NOTE — PROCEDURE: EXCISION
Consent: Written consent obtained. Risks include but not limited to lid/brow ptosis, bruising, swelling, diplopia, temporary effect, incomplete chemical denervation. Peng Advancement Flap Text: The defect edges were debeveled with a #15 scalpel blade.  Given the location of the defect, shape of the defect and the proximity to free margins a Peng advancement flap was deemed most appropriate.  Using a sterile surgical marker, an appropriate advancement flap was drawn incorporating the defect and placing the expected incisions within the relaxed skin tension lines where possible. The area thus outlined was incised deep to adipose tissue with a #15 scalpel blade.  The skin margins were undermined to an appropriate distance in all directions utilizing iris scissors.

## 2022-08-29 NOTE — PROCEDURE: EXCISION
Received prescription renewal request for amphetamine-dextroamphetamine (Adderall XR) 30 MG 24 hr capsule    Last appt: 04/12/2022  Missed appt(s): NONE  Follow up 3 months   Next appt:  08/25/2022    Verified dosage(s) against:   [x] provider appt note   [x] Other: Medication list/Rx history    Last Rx on 04/12/2022 for 30 day supply-fill date 04/30/2022    Per ePDMP, last dispensed on 05/16/2022, sold 05/18/2022    Is the patient due for refill of this medication(s):   [x]  Yes-pt requesting  []  NO    PDMP review:   [x]  Criteria met. Prescription sent to provider to sign.  []  Criteria NOT MET-      Dermal Autograft Text: The defect edges were debeveled with a #15 scalpel blade.  Given the location of the defect, shape of the defect and the proximity to free margins a dermal autograft was deemed most appropriate.  Using a sterile surgical marker, the primary defect shape was transferred to the donor site. The area thus outlined was incised deep to adipose tissue with a #15 scalpel blade.  The harvested graft was then trimmed of adipose and epidermal tissue until only dermis was left.  The skin graft was then placed in the primary defect and oriented appropriately.

## 2022-12-20 ENCOUNTER — APPOINTMENT (RX ONLY)
Dept: URBAN - METROPOLITAN AREA CLINIC 52 | Facility: CLINIC | Age: 62
Setting detail: DERMATOLOGY
End: 2022-12-20

## 2022-12-20 DIAGNOSIS — L81.4 OTHER MELANIN HYPERPIGMENTATION: ICD-10-CM | Status: STABLE

## 2022-12-20 DIAGNOSIS — L85.3 XEROSIS CUTIS: ICD-10-CM | Status: INADEQUATELY CONTROLLED

## 2022-12-20 DIAGNOSIS — L57.8 OTHER SKIN CHANGES DUE TO CHRONIC EXPOSURE TO NONIONIZING RADIATION: ICD-10-CM | Status: STABLE

## 2022-12-20 DIAGNOSIS — Z85.820 PERSONAL HISTORY OF MALIGNANT MELANOMA OF SKIN: ICD-10-CM | Status: STABLE

## 2022-12-20 DIAGNOSIS — Z85.828 PERSONAL HISTORY OF OTHER MALIGNANT NEOPLASM OF SKIN: ICD-10-CM | Status: STABLE

## 2022-12-20 DIAGNOSIS — L82.1 OTHER SEBORRHEIC KERATOSIS: ICD-10-CM | Status: STABLE

## 2022-12-20 DIAGNOSIS — D18.0 HEMANGIOMA: ICD-10-CM | Status: STABLE

## 2022-12-20 DIAGNOSIS — D22 MELANOCYTIC NEVI: ICD-10-CM | Status: STABLE

## 2022-12-20 PROBLEM — D18.01 HEMANGIOMA OF SKIN AND SUBCUTANEOUS TISSUE: Status: ACTIVE | Noted: 2022-12-20

## 2022-12-20 PROBLEM — D22.5 MELANOCYTIC NEVI OF TRUNK: Status: ACTIVE | Noted: 2022-12-20

## 2022-12-20 PROCEDURE — 99214 OFFICE O/P EST MOD 30 MIN: CPT

## 2022-12-20 PROCEDURE — ? TREATMENT REGIMEN

## 2022-12-20 PROCEDURE — ? COUNSELING

## 2022-12-20 PROCEDURE — ? PRESCRIPTION MEDICATION MANAGEMENT

## 2022-12-20 PROCEDURE — ? FULL BODY SKIN EXAM

## 2022-12-20 ASSESSMENT — LOCATION SIMPLE DESCRIPTION DERM
LOCATION SIMPLE: LEFT PRETIBIAL REGION
LOCATION SIMPLE: UPPER BACK
LOCATION SIMPLE: ABDOMEN
LOCATION SIMPLE: LEFT UPPER BACK
LOCATION SIMPLE: LEFT UPPER ARM
LOCATION SIMPLE: RIGHT UPPER BACK

## 2022-12-20 ASSESSMENT — LOCATION DETAILED DESCRIPTION DERM
LOCATION DETAILED: PERIUMBILICAL SKIN
LOCATION DETAILED: SUPERIOR THORACIC SPINE
LOCATION DETAILED: LEFT DISTAL PRETIBIAL REGION
LOCATION DETAILED: RIGHT INFERIOR UPPER BACK
LOCATION DETAILED: LEFT SUPERIOR MEDIAL UPPER BACK
LOCATION DETAILED: LEFT ANTERIOR PROXIMAL UPPER ARM

## 2022-12-20 ASSESSMENT — LOCATION ZONE DERM
LOCATION ZONE: LEG
LOCATION ZONE: TRUNK
LOCATION ZONE: ARM

## 2023-01-10 NOTE — PROCEDURE: MOHS SURGERY
Reason for call:  Other   Patient called regarding (reason for call): call back  Additional comments: Pt needs to be seen for hospital follow up Hospital Follow Up: [facility], 12/29-01/10: [facility]. Mother would prefer Friday the 13th for an appt with anyone at [facility]. Please contact pts mother. Pt also needs labs and xray.     Phone number to reach patient:  Cell number on file:    Telephone Information:   Mobile [phone]       Best Time:  anytime    Can we leave a detailed message on this number?  YES    Travel screening: Not Applicable     Nostril Rim Text: The closure involved the nostril rim.

## 2023-03-21 ENCOUNTER — APPOINTMENT (RX ONLY)
Dept: URBAN - METROPOLITAN AREA CLINIC 52 | Facility: CLINIC | Age: 63
Setting detail: DERMATOLOGY
End: 2023-03-21

## 2023-03-21 DIAGNOSIS — L57.8 OTHER SKIN CHANGES DUE TO CHRONIC EXPOSURE TO NONIONIZING RADIATION: ICD-10-CM | Status: INADEQUATELY CONTROLLED

## 2023-03-21 DIAGNOSIS — D485 NEOPLASM OF UNCERTAIN BEHAVIOR OF SKIN: ICD-10-CM

## 2023-03-21 DIAGNOSIS — L81.4 OTHER MELANIN HYPERPIGMENTATION: ICD-10-CM | Status: STABLE

## 2023-03-21 DIAGNOSIS — L57.0 ACTINIC KERATOSIS: ICD-10-CM

## 2023-03-21 DIAGNOSIS — Z85.820 PERSONAL HISTORY OF MALIGNANT MELANOMA OF SKIN: ICD-10-CM | Status: STABLE

## 2023-03-21 PROBLEM — D48.5 NEOPLASM OF UNCERTAIN BEHAVIOR OF SKIN: Status: ACTIVE | Noted: 2023-03-21

## 2023-03-21 PROCEDURE — 99213 OFFICE O/P EST LOW 20 MIN: CPT | Mod: 25

## 2023-03-21 PROCEDURE — ? COUNSELING

## 2023-03-21 PROCEDURE — ? LIQUID NITROGEN

## 2023-03-21 PROCEDURE — 11102 TANGNTL BX SKIN SINGLE LES: CPT

## 2023-03-21 PROCEDURE — ? TREATMENT REGIMEN

## 2023-03-21 PROCEDURE — 17000 DESTRUCT PREMALG LESION: CPT | Mod: 59

## 2023-03-21 PROCEDURE — ? BIOPSY BY SHAVE METHOD

## 2023-03-21 ASSESSMENT — LOCATION SIMPLE DESCRIPTION DERM
LOCATION SIMPLE: LEFT FOREARM
LOCATION SIMPLE: RIGHT FOREARM
LOCATION SIMPLE: LEFT WRIST

## 2023-03-21 ASSESSMENT — LOCATION DETAILED DESCRIPTION DERM
LOCATION DETAILED: LEFT PROXIMAL DORSAL FOREARM
LOCATION DETAILED: LEFT DORSAL WRIST
LOCATION DETAILED: RIGHT PROXIMAL RADIAL DORSAL FOREARM
LOCATION DETAILED: LEFT DISTAL DORSAL FOREARM
LOCATION DETAILED: RIGHT DISTAL DORSAL FOREARM
LOCATION DETAILED: RIGHT VENTRAL DISTAL FOREARM

## 2023-03-21 ASSESSMENT — LOCATION ZONE DERM: LOCATION ZONE: ARM

## 2023-03-21 NOTE — PROCEDURE: BIOPSY BY SHAVE METHOD
Detail Level: Detailed
Depth Of Biopsy: dermis
Was A Bandage Applied: Yes
Size Of Lesion In Cm: 0.8
X Size Of Lesion In Cm: 0
Biopsy Type: H and E
Biopsy Method: Personna blade
Anesthesia Type: 1% lidocaine with epinephrine
Anesthesia Volume In Cc (Will Not Render If 0): 0.5
Hemostasis: Krystal's
Wound Care: Petrolatum
Dressing: bandage
Destruction After The Procedure: No
Type Of Destruction Used: Curettage
Curettage Text: The wound bed was treated with curettage after the biopsy was performed.
Cryotherapy Text: The wound bed was treated with cryotherapy after the biopsy was performed.
Electrodesiccation Text: The wound bed was treated with electrodesiccation after the biopsy was performed.
Electrodesiccation And Curettage Text: The wound bed was treated with electrodesiccation and curettage after the biopsy was performed.
Silver Nitrate Text: The wound bed was treated with silver nitrate after the biopsy was performed.
Lab: -7
Lab Facility: 3
Consent: Written consent was obtained and risks were reviewed including but not limited to scarring, infection, bleeding, scabbing, incomplete removal, nerve damage and allergy to anesthesia.
Post-Care Instructions: I reviewed with the patient in detail post-care instructions. Patient to keep bandaid on for 24 hours. Then remove, wash with soap and water and apply vaseline twice daily until healed.
Notification Instructions: Patient will be notified of biopsy results. However, patient instructed to call the office if not contacted within 2 weeks.
Billing Type: Third-Party Bill
Information: Selecting Yes will display possible errors in your note based on the variables you have selected. This validation is only offered as a suggestion for you. PLEASE NOTE THAT THE VALIDATION TEXT WILL BE REMOVED WHEN YOU FINALIZE YOUR NOTE. IF YOU WANT TO FAX A PRELIMINARY NOTE YOU WILL NEED TO TOGGLE THIS TO 'NO' IF YOU DO NOT WANT IT IN YOUR FAXED NOTE.

## 2023-03-21 NOTE — PROCEDURE: LIQUID NITROGEN
Post-Care Instructions: I reviewed with the patient in detail post-care instructions. Patient is to wear sunprotection, and avoid picking at any of the treated lesions. Pt may apply Vaseline to crusted or scabbing areas.
Render Post-Care Instructions In Note?: no
Show Aperture Variable?: Yes
Consent: The patient's consent was obtained including but not limited to risks of crusting, scabbing, blistering, scarring, darker or lighter pigmentary change, recurrence, incomplete removal and infection.
Detail Level: Zone
Duration Of Freeze Thaw-Cycle (Seconds): 3
Number Of Freeze-Thaw Cycles: 3 freeze-thaw cycles

## 2023-04-20 NOTE — PROCEDURE: EXCISION
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 Pt felt like his AICD fired. Pt missed dialysis session yesterday because he was sent to [facility] for dialysis and had a long wait, left AMA. While waiting he slid out of his wheelchair and hit the back of his head and fell on his left hip. Denies recent fevers, chills, cough, sob, abd pain, diarrhea, constipation. Doesn't make urine, has a suprapubic catheter. 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 Pt felt like his AICD fired. Pt missed dialysis session yesterday because he was sent to [facility] for dialysis and had a long wait, left AMA. While waiting he slid out of his wheelchair and hit the back of his head and fell on his left hip. Denies recent fevers, chills, cough, sob, abd pain, diarrhea, constipation. Doesn't make urine, has a suprapubic catheter. 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 Pt felt like his AICD fired x2. Pt missed dialysis session yesterday because he was sent to [facility] for evaluation after slipping out of wheelchair and had a long wait, left AMA. He slid out of his wheelchair and hit the back of his head and fell on his left hip. Denies recent fevers, chills, cough, sob, abd pain, diarrhea, constipation. Doesn't make urine, has a suprapubic catheter. 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, seizures, Afib s/p ablation on AC (Eliquis), CAD s/p PCI, cardiomyopathy (EF 45% in 9/22), AICD in 2018, neurogenic bladder with SPC, COPD/BARRY on O2 at night w/o CPAP, hypothyroidism, anemia,  ESRD on HD (M, W,F) via RIJ P/C, RCC s/p left partial nephrotomy, chronic foot pressure ulcers, wheelchair bound presents from [facility] for left-sided chest pain around 5am this AM described as pressure and felt a jolt that radiated to left-arm and left his left arm feeling numb, a/w weakness and blurry vision for a couple of seconds not a/w nausea, vomiting, sob, diaphoresis. Pt felt like his AICD fired x2. Pt missed dialysis session yesterday because he was sent to [facility] for evaluation after slipping out of wheelchair and had a long wait, left AMA. He slid out of his wheelchair and hit the back of his head and fell on his left hip. Denies recent fevers, chills, cough, sob, abd pain, diarrhea, constipation. Doesn't make urine, has a suprapubic catheter.

## 2023-04-28 ENCOUNTER — APPOINTMENT (RX ONLY)
Dept: URBAN - METROPOLITAN AREA CLINIC 52 | Facility: CLINIC | Age: 63
Setting detail: DERMATOLOGY
End: 2023-04-28

## 2023-04-28 PROBLEM — C44.622 SQUAMOUS CELL CARCINOMA OF SKIN OF RIGHT UPPER LIMB, INCLUDING SHOULDER: Status: ACTIVE | Noted: 2023-04-28

## 2023-04-28 PROCEDURE — 11602 EXC TR-EXT MAL+MARG 1.1-2 CM: CPT

## 2023-04-28 PROCEDURE — ? EXCISION

## 2023-04-28 PROCEDURE — 12032 INTMD RPR S/A/T/EXT 2.6-7.5: CPT

## 2023-04-28 NOTE — PROCEDURE: EXCISION
Arrived to ICU with OETT to mechanical ventilation, propofol sedation  10/23 - remains intubated on mechanical ventilation; oxygen demand decreased; large amount of thick secretions from trach and oral pharynx  10/24 - Intubated, improved aeration of lung  10/25 - self extubated yesterday; self removed midline overnight  10/26 - weaned to room air; continue drainage from wounds and frequent liquid stool contamination required flexiseal system and wound vac application  10/27 - seen and examined on return from OR tracheostomy placement; remains sedated on mechanical ventilation via new #8 shiley trach with obturator at head of bed  10/28 - seen and examined on mechanical ventilation and again after transition to trach collar; wound vac contaminated with liquid stool overnight s/t fecal management system leakage, vac changed by WOC today  10/29 - semi-erect in bed eyes open and awake and alert not following commands suspect at baseline.  On blow by O2 to Trach in no distress with VSS awaiting OR for diverting colostomy and TF held.  10/30 - supine in bed eyes open and awake grimacing with colostomy wound care in no distress with VSS S/P Colostomy yesterday     O-L Flap Text: The defect edges were debeveled with a #15 scalpel blade.  Given the location of the defect, shape of the defect and the proximity to free margins an O-L flap was deemed most appropriate.  Using a sterile surgical marker, an appropriate advancement flap was drawn incorporating the defect and placing the expected incisions within the relaxed skin tension lines where possible.    The area thus outlined was incised deep to adipose tissue with a #15 scalpel blade.  The skin margins were undermined to an appropriate distance in all directions utilizing iris scissors.

## 2023-04-28 NOTE — PROCEDURE: EXCISION
Initial (On Arrival) Posterior Auricular Interpolation Flap Text: A decision was made to reconstruct the defect utilizing an interpolation axial flap and a staged reconstruction.  A telfa template was made of the defect.  This telfa template was then used to outline the posterior auricular interpolation flap.  The donor area for the pedicle flap was then injected with anesthesia.  The flap was excised through the skin and subcutaneous tissue down to the layer of the underlying musculature.  The pedicle flap was carefully excised within this deep plane to maintain its blood supply.  The edges of the donor site were undermined.   The donor site was closed in a primary fashion.  The pedicle was then rotated into position and sutured.  Once the tube was sutured into place, adequate blood supply was confirmed with blanching and refill.  The pedicle was then wrapped with xeroform gauze and dressed appropriately with a telfa and gauze bandage to ensure continued blood supply and protect the attached pedicle.

## 2024-05-16 ENCOUNTER — APPOINTMENT (RX ONLY)
Dept: URBAN - METROPOLITAN AREA CLINIC 52 | Facility: CLINIC | Age: 64
Setting detail: DERMATOLOGY
End: 2024-05-16

## 2024-05-16 DIAGNOSIS — L82.1 OTHER SEBORRHEIC KERATOSIS: ICD-10-CM

## 2024-05-16 PROBLEM — D48.5 NEOPLASM OF UNCERTAIN BEHAVIOR OF SKIN: Status: ACTIVE | Noted: 2024-05-16

## 2024-05-16 PROCEDURE — ? BIOPSY BY SHAVE METHOD

## 2024-05-16 PROCEDURE — ? COUNSELING

## 2024-05-16 PROCEDURE — ? BENIGN DESTRUCTION COSMETIC

## 2024-05-16 PROCEDURE — 11102 TANGNTL BX SKIN SINGLE LES: CPT

## 2024-05-16 PROCEDURE — ? ADDITIONAL NOTES

## 2024-05-16 ASSESSMENT — LOCATION SIMPLE DESCRIPTION DERM
LOCATION SIMPLE: LEFT FOREHEAD
LOCATION SIMPLE: RIGHT EYEBROW
LOCATION SIMPLE: RIGHT ZYGOMA
LOCATION SIMPLE: SUPERIOR FOREHEAD
LOCATION SIMPLE: LEFT CHEEK
LOCATION SIMPLE: RIGHT SCALP

## 2024-05-16 ASSESSMENT — LOCATION DETAILED DESCRIPTION DERM
LOCATION DETAILED: RIGHT CENTRAL ZYGOMA
LOCATION DETAILED: SUPERIOR MID FOREHEAD
LOCATION DETAILED: RIGHT MEDIAL FRONTAL SCALP
LOCATION DETAILED: LEFT INFERIOR CENTRAL MALAR CHEEK
LOCATION DETAILED: LEFT LATERAL MALAR CHEEK
LOCATION DETAILED: RIGHT CENTRAL EYEBROW
LOCATION DETAILED: LEFT SUPERIOR MEDIAL FOREHEAD

## 2024-05-16 ASSESSMENT — LOCATION ZONE DERM
LOCATION ZONE: SCALP
LOCATION ZONE: FACE

## 2024-05-16 NOTE — PROCEDURE: BENIGN DESTRUCTION COSMETIC
Detail Level: Detailed
Consent: The patient's consent was obtained including but not limited to risks of crusting, scabbing, blistering, scarring, darker or lighter pigmentary change, recurrence, incomplete removal and infection.
Post-Care Instructions: I reviewed with the patient in detail post-care instructions. Patient is to wear sunprotection, and avoid picking at any of the treated lesions. Pt may apply Vaseline to crusted or scabbing areas.
Price (Use Numbers Only, No Special Characters Or $): 105
Anesthesia Volume In Cc: 0.5

## 2024-05-16 NOTE — PROCEDURE: BIOPSY BY SHAVE METHOD
Detail Level: Detailed
Depth Of Biopsy: dermis
Was A Bandage Applied: Yes
Size Of Lesion In Cm: 0.6
X Size Of Lesion In Cm: 0
Biopsy Type: H and E
Biopsy Method: Dermablade
Anesthesia Type: 1% lidocaine with epinephrine
Anesthesia Volume In Cc: 0.5
Hemostasis: Krystal's
Wound Care: Aquaphor
Dressing: bandage
Destruction After The Procedure: No
Type Of Destruction Used: Curettage
Curettage Text: The wound bed was treated with curettage after the biopsy was performed.
Cryotherapy Text: The wound bed was treated with cryotherapy after the biopsy was performed.
Electrodesiccation Text: The wound bed was treated with electrodesiccation after the biopsy was performed.
Electrodesiccation And Curettage Text: The wound bed was treated with electrodesiccation and curettage after the biopsy was performed.
Silver Nitrate Text: The wound bed was treated with silver nitrate after the biopsy was performed.
Lab: -[id number]
Lab Facility: [id number]
Consent: Written consent was obtained and risks were reviewed including but not limited to scarring, infection, bleeding, scabbing, incomplete removal, nerve damage and allergy to anesthesia.
Post-Care Instructions: I reviewed with the patient in detail post-care instructions. Patient is to keep the biopsy site dry overnight, and then apply bacitracin twice daily until healed. Patient may apply hydrogen peroxide soaks to remove any crusting.
Notification Instructions: Patient will be notified of biopsy results. However, patient instructed to call the office if not contacted within 2 weeks.
Billing Type: Third-Party Bill
Information: Selecting Yes will display possible errors in your note based on the variables you have selected. This validation is only offered as a suggestion for you. PLEASE NOTE THAT THE VALIDATION TEXT WILL BE REMOVED WHEN YOU FINALIZE YOUR NOTE. IF YOU WANT TO FAX A PRELIMINARY NOTE YOU WILL NEED TO TOGGLE THIS TO 'NO' IF YOU DO NOT WANT IT IN YOUR FAXED NOTE.

## 2024-05-16 NOTE — PROCEDURE: ADDITIONAL NOTES
Render Risk Assessment In Note?: no
Detail Level: Simple
Additional Notes: Patient aware to go to ophthalmologist for treatment if removal is desired

## 2024-05-29 ENCOUNTER — APPOINTMENT (RX ONLY)
Dept: URBAN - METROPOLITAN AREA CLINIC 52 | Facility: CLINIC | Age: 64
Setting detail: DERMATOLOGY
End: 2024-05-29

## 2024-05-29 DIAGNOSIS — L57.0 ACTINIC KERATOSIS: ICD-10-CM

## 2024-05-29 PROCEDURE — 17000 DESTRUCT PREMALG LESION: CPT

## 2024-05-29 PROCEDURE — ? LIQUID NITROGEN

## 2024-05-29 ASSESSMENT — LOCATION DETAILED DESCRIPTION DERM: LOCATION DETAILED: LEFT INFERIOR MEDIAL MALAR CHEEK

## 2024-05-29 ASSESSMENT — LOCATION SIMPLE DESCRIPTION DERM: LOCATION SIMPLE: LEFT CHEEK

## 2024-05-29 ASSESSMENT — LOCATION ZONE DERM: LOCATION ZONE: FACE

## 2024-05-29 NOTE — PROCEDURE: LIQUID NITROGEN
Consent: The patient's consent was obtained including but not limited to risks of crusting, scabbing, blistering, scarring, darker or lighter pigmentary change, recurrence, incomplete removal and infection.
Post-Care Instructions: I reviewed with the patient in detail post-care instructions. Patient is to wear sunprotection, and avoid picking at any of the treated lesions. Pt may apply Vaseline to crusted or scabbing areas.
Detail Level: Simple
Show Aperture Variable?: Yes
Render Note In Bullet Format When Appropriate: No
Duration Of Freeze Thaw-Cycle (Seconds): 2
Number Of Freeze-Thaw Cycles: 2 freeze-thaw cycles
Application Tool (Optional): Liquid Nitrogen Sprayer

## 2024-07-12 NOTE — PROCEDURE: EXCISION
07-11-24 @ 07:01  -  07-12-24 @ 07:00  --------------------------------------------------------  OUT:    Ileostomy (mL): 2000 mL  Total OUT: 2000 mL    Total NET: -2000 mL      07-12-24 @ 07:01  -  07-12-24 @ 12:59  --------------------------------------------------------  OUT:    Ileostomy (mL): 500 mL  Total OUT: 500 mL    Total NET: -500 mL       A-T Advancement Flap Text: The defect edges were debeveled with a #15 scalpel blade.  Given the location of the defect, shape of the defect and the proximity to free margins an A-T advancement flap was deemed most appropriate.  Using a sterile surgical marker, an appropriate advancement flap was drawn incorporating the defect and placing the expected incisions within the relaxed skin tension lines where possible.    The area thus outlined was incised deep to adipose tissue with a #15 scalpel blade.  The skin margins were undermined to an appropriate distance in all directions utilizing iris scissors.

## 2024-10-15 NOTE — PROCEDURE: EXCISION
Additional History: She was taking terbinafine for nail fungus. She states the terbinafine helped with nail growth. Suturegard Retention Suture: 2-0 Nylon

## 2024-11-27 ENCOUNTER — APPOINTMENT (RX ONLY)
Dept: URBAN - METROPOLITAN AREA CLINIC 52 | Facility: CLINIC | Age: 64
Setting detail: DERMATOLOGY
End: 2024-11-27

## 2024-11-27 DIAGNOSIS — Z85.820 PERSONAL HISTORY OF MALIGNANT MELANOMA OF SKIN: ICD-10-CM

## 2024-11-27 DIAGNOSIS — D22 MELANOCYTIC NEVI: ICD-10-CM

## 2024-11-27 DIAGNOSIS — L81.4 OTHER MELANIN HYPERPIGMENTATION: ICD-10-CM

## 2024-11-27 DIAGNOSIS — D18.0 HEMANGIOMA: ICD-10-CM

## 2024-11-27 DIAGNOSIS — Z85.828 PERSONAL HISTORY OF OTHER MALIGNANT NEOPLASM OF SKIN: ICD-10-CM

## 2024-11-27 DIAGNOSIS — L82.1 OTHER SEBORRHEIC KERATOSIS: ICD-10-CM

## 2024-11-27 DIAGNOSIS — L73.8 OTHER SPECIFIED FOLLICULAR DISORDERS: ICD-10-CM | Status: INADEQUATELY CONTROLLED

## 2024-11-27 PROBLEM — D22.5 MELANOCYTIC NEVI OF TRUNK: Status: ACTIVE | Noted: 2024-11-27

## 2024-11-27 PROBLEM — D18.01 HEMANGIOMA OF SKIN AND SUBCUTANEOUS TISSUE: Status: ACTIVE | Noted: 2024-11-27

## 2024-11-27 PROBLEM — D22.39 MELANOCYTIC NEVI OF OTHER PARTS OF FACE: Status: ACTIVE | Noted: 2024-11-27

## 2024-11-27 PROBLEM — D23.71 OTHER BENIGN NEOPLASM OF SKIN OF RIGHT LOWER LIMB, INCLUDING HIP: Status: ACTIVE | Noted: 2024-11-27

## 2024-11-27 PROCEDURE — ? PRESCRIPTION

## 2024-11-27 PROCEDURE — ? PRESCRIPTION MEDICATION MANAGEMENT

## 2024-11-27 PROCEDURE — ? COUNSELING

## 2024-11-27 PROCEDURE — 99213 OFFICE O/P EST LOW 20 MIN: CPT

## 2024-11-27 PROCEDURE — ? TREATMENT REGIMEN

## 2024-11-27 PROCEDURE — ? FULL BODY SKIN EXAM

## 2024-11-27 RX ORDER — CLINDAMYCIN PHOSPHATE 10 MG/ML
SOLUTION TOPICAL BID
Qty: 60 | Refills: 4 | Status: ERX | COMMUNITY
Start: 2024-11-27

## 2024-11-27 RX ADMIN — CLINDAMYCIN PHOSPHATE: 10 SOLUTION TOPICAL at 00:00

## 2024-11-27 ASSESSMENT — LOCATION DETAILED DESCRIPTION DERM
LOCATION DETAILED: RIGHT INFERIOR CENTRAL MALAR CHEEK
LOCATION DETAILED: RIGHT RIB CAGE
LOCATION DETAILED: RIGHT LATERAL SUPERIOR CHEST
LOCATION DETAILED: RIGHT MEDIAL BREAST 2-3:00 REGION
LOCATION DETAILED: LEFT SUPRAPUBIC SKIN
LOCATION DETAILED: RIGHT ANTERIOR SHOULDER

## 2024-11-27 ASSESSMENT — LOCATION SIMPLE DESCRIPTION DERM
LOCATION SIMPLE: RIGHT CHEEK
LOCATION SIMPLE: RIGHT BREAST
LOCATION SIMPLE: RIGHT SHOULDER
LOCATION SIMPLE: GROIN
LOCATION SIMPLE: ABDOMEN
LOCATION SIMPLE: CHEST

## 2024-11-27 ASSESSMENT — LOCATION ZONE DERM
LOCATION ZONE: TRUNK
LOCATION ZONE: ARM
LOCATION ZONE: FACE

## 2024-11-27 NOTE — PROCEDURE: PRESCRIPTION MEDICATION MANAGEMENT
Detail Level: Zone
Render In Strict Bullet Format?: No
Initiate Treatment: clindamycin phosphate 1 % topical swab BID

## 2024-11-27 NOTE — HPI: EVALUATION OF SKIN LESION(S)
Assessment: 65 F PMH Mitral regurgitation, rheumatic heart disease, atrial fibrillation, HTN, on Digoxin, Warfarin, DM 2 on Jardiance presenting with flu like symptoms, tachycardia, and hypotension requiring pressure support. Likely mutlifactorial shock, primarily septic with component of cardiogenic.     Neuro:  Alert and orientated x3 at baseline  No active issues    Cardiovascular:   #Atrial fibrillation with RVR  #Mitral valvue regurgitation  #Mechanical aortic valve replacement  #History of rheumatic heart disease  #Shock, Septic vs cardiogenic  - required pressors likely iso shock, Josep gtt weaned off since 12am  - Lopressor PO BID increased to 25mg TID for persistent Afib RVR  - s/p Lopressor IVP PRN   - TTE from 11/2023 shows The LV cavity is small. LV systolic function is severely decreased with EF 37 %   - TTE 2/20 pending  - Continue Warfarin, INR goal 2.5-3.5   - c/w digoxin every other day  - Digoxin by level, draw in a few days    #HTN  - Hold home Enalapril in the setting of hypotension  #HLD  - Continue Atorvastatin 20 mg daily    Pulmonology  - CXR shows clear lungs  - POCUS shows b/i b lines  - Saturating well on room air, protecting airway    GI  RICHY  # Transaminitis   , , Alk phos and Bili normal   - unknown etiology  - f/u hepatitis panel  - RUQ US    Diet: NPO   - continue to keep NPO until anion gap closes  - bowel regimen        Renal  #Anion gap metabolic acidosis  #Lactic acidosis  #? Ketoacidosis  - S/p 3L IVF  - Monitor VBGs q 12  - Treat underlying cause, possible Euglycemic DKA vs Sepsis   - hyperlactatemia likely iso renal/liver dysfunction metformin vs urosepsis, continue to trend  #LUISA  - Cr from 11/2023 of 0.39  - Admission Cr of .89  - LUISA likely pre-renal in the setting of shock, now slightly downtrending  - Urine electrolytes, UA, Serum osmolality  - Monitor UOP and Cr  - Adequate fluid hydration    ID  #Septic shock in the setting of URI vs UTI    - Complained of burning on urination on 2/15, No CVA tenderness   - leukocytosis 15, Tmax 100.6F  - UA positive, f/u urine culture  - f/u blood culture  - s/p Azithromyin x1, continue Ceftriaxone 1g daily for 14 days (adjust as per cultures)   - RVP negative      Endo  #?Euglycemic DKA  #DM2  pH 7.27 AG 20, Bicarb 15, Glucose 201, Lactate 4  - s/p 4 L IVF  - Pending BHB  - History of type DM2 on Jardiance, now presenting with infection, polyuria, polydipsia  - c/w insulin gtt and VF per DKA protocol.   - Endocrine consult.  - electrolytes repletion with q4 hour BMPs    Heme/Onc  - Continue Warfarin with INR goal of 2.5-3.5   - daily coags    Ethics  Full Code    
Assessment: 65 F PMH Mitral regurgitation, rheumatic heart disease, atrial fibrillation, HTN, on Digoxin, Warfarin, DM 2 on Jardiance presenting with flu like symptoms, tachycardia, and hypotension requiring pressure support. Likely mutlifactorial shock, primarily septic with component of cardiogenic.     Neuro:  Alert and orientated x3 at baseline  No active issues  - currently at AOx3, following commands  - activity- oob to chair    Cardiovascular:   #Atrial fibrillation with RVR  #Mitral valvue regurgitation  #Mechanical aortic valve replacement  #History of rheumatic heart disease  #Shock, Septic vs cardiogenic  - required pressors likely iso shock, Josep gtt weaned off since 12am 2/20  - Remained Afib -170's w/o hypotension despite initiating and uptrending home PO lopressor   - s/p Lopressor IVP PRN- no change in HR/BP, however c/o "not feeling well" and diaphoretic soon after administered IV  - TTE 2/20  shows The LV cavity is small. LV systolic function is severely decreased with EF 37 %   -  - Continue Warfarin, INR goal 2.5-3.5   - c/w digoxin every other day  - Digoxin by level, draw in a few days    #HTN  - Hold home Enalapril in the setting of hypotension  #HLD  - Continue Atorvastatin 20 mg daily    Pulmonology  - CXR shows clear lungs  - POCUS shows b/i b lines  - Saturating well on room air, protecting airway    GI  RICHY  # Transaminitis   , , Alk phos and Bili normal   - unknown etiology  - f/u hepatitis panel  - RUQ US    Diet: NPO   - continue to keep NPO until anion gap closes  - bowel regimen        Renal  #Anion gap metabolic acidosis  #Lactic acidosis  #? Ketoacidosis  - S/p 3L IVF  - Monitor VBGs q 12  - Treat underlying cause, possible Euglycemic DKA vs Sepsis   - hyperlactatemia likely iso renal/liver dysfunction metformin vs urosepsis, continue to trend  #LUISA  - Cr from 11/2023 of 0.39  - Admission Cr of .89  - LUISA likely pre-renal in the setting of shock, now slightly downtrending  - Urine electrolytes, UA, Serum osmolality  - Monitor UOP and Cr  - Adequate fluid hydration    ID  #Septic shock in the setting of URI vs UTI    - Complained of burning on urination on 2/15, No CVA tenderness   - leukocytosis 15, Tmax 100.6F  - UA positive, f/u urine culture  - f/u blood culture  - s/p Azithromyin x1, continue Ceftriaxone 1g daily for 14 days (adjust as per cultures)   - RVP negative      Endo  #?Euglycemic DKA  #DM2  pH 7.27 AG 20, Bicarb 15, Glucose 201, Lactate 4  - s/p 4 L IVF  - Pending BHB  - History of type DM2 on Jardiance, now presenting with infection, polyuria, polydipsia  - c/w insulin gtt and VF per DKA protocol.   - Endocrine consult.  - electrolytes repletion with q4 hour BMPs    Heme/Onc  - Continue Warfarin with INR goal of 2.5-3.5   - daily coags    Ethics  Full Code    
Assessment: 65 F PMH Mitral regurgitation, rheumatic heart disease, atrial fibrillation, HTN, on Digoxin, Warfarin, DM 2 on Jardiance presenting with flu like symptoms, tachycardia, and hypotension requiring pressure support. Likely mutlifactorial shock, primarily septic with component of cardiogenic.     Neuro:  Alert and orientated x3 at baseline  No active issues  - currently at AOx3, following commands  - activity- oob to chair    Cardiovascular:   #Atrial fibrillation with RVR  #Mitral valvue regurgitation  #Mechanical aortic valve replacement  #History of rheumatic heart disease  #Shock, Septic vs cardiogenic  - required pressors likely iso shock, Josep gtt weaned off since 12am 2/20  - Remained Afib -170's w/o hypotension despite initiating and uptrending home PO lopressor   - s/p Lopressor IVP PRN- no change in HR/BP, however c/o "not feeling well" and diaphoretic soon after administered IV likely 2/2 to decompensated heart failure?  - TTE 2/20  shows The LV cavity is small. LV systolic function is severely decreased with EF 37 %   -   - Continue Warfarin, INR goal 2.5-3.5   - c/w digoxin every other day  - Digoxin by level, draw in a few days    #HTN  - Hold home Enalapril in the setting of hypotension  #HLD  - Continue Atorvastatin 20 mg daily    Pulmonology  - CXR shows clear lungs  - POCUS shows b/i b lines  - Saturating well on room air, protecting airway    GI  RICHY  # Transaminitis   , , Alk phos and Bili normal   - unknown etiology  - f/u hepatitis panel  - RUQ US    Diet: NPO   - continue to keep NPO until anion gap closes  - bowel regimen        Renal  #Anion gap metabolic acidosis  #Lactic acidosis  #? Ketoacidosis  - S/p 3L IVF  - Monitor VBGs q 12  - Treat underlying cause, possible Euglycemic DKA vs Sepsis   - hyperlactatemia likely iso renal/liver dysfunction metformin vs urosepsis, continue to trend  #LUISA  - Cr from 11/2023 of 0.39  - Admission Cr of .89  - LUISA likely pre-renal in the setting of shock, now slightly downtrending  - Urine electrolytes, UA, Serum osmolality  - Monitor UOP and Cr  - Adequate fluid hydration    ID  #Septic shock in the setting of URI vs UTI    - Complained of burning on urination on 2/15, No CVA tenderness   - leukocytosis 15, Tmax 100.6F  - UA positive, f/u urine culture  - f/u blood culture  - s/p Azithromyin x1, continue Ceftriaxone 1g daily for 14 days (adjust as per cultures)   - RVP negative      Endo  #?Euglycemic DKA  #DM2  pH 7.27 AG 20, Bicarb 15, Glucose 201, Lactate 4  - s/p 4 L IVF  - Pending BHB  - History of type DM2 on Jardiance, now presenting with infection, polyuria, polydipsia  - c/w insulin gtt and VF per DKA protocol.   - Endocrine consult.  - electrolytes repletion with q4 hour BMPs    Heme/Onc  - Continue Warfarin with INR goal of 2.5-3.5   - daily coags    Ethics  Full Code    
Assessment: 65 F PMH Mitral regurgitation, rheumatic heart disease, atrial fibrillation, HTN, on Digoxin, Warfarin, DM 2 on Jardiance presenting with flu like symptoms, tachycardia, and hypotension requiring pressure support. Likely mutlifactorial shock, primarily septic with component of cardiogenic.     Neuro:  Alert and orientated x3 at baseline  No active issues  - currently at AOx3, following commands  - activity- oob to chair    Cardiovascular:   #Atrial fibrillation with RVR  #Mitral valvue regurgitation  #Mechanical aortic valve replacement  #History of rheumatic heart disease  #Shock, Septic vs cardiogenic  - required pressors likely iso shock, Josep gtt weaned off since 12am 2/20  - Remained Afib -170's w/o hypotension despite initiating and uptrending home PO lopressor   - s/p Lopressor IVP PRN- no change in HR/BP, however c/o "not feeling well" and diaphoretic soon after administered IV likely 2/2 to decompensated heart failure?  - TTE 2/20  shows The LV cavity is small. LV systolic function is severely decreased with EF 37 %   -   - Continue Warfarin, INR goal 2.5-3.5   - c/w digoxin every other day  - Digoxin by level, draw in a few days    #HTN  - Hold home Enalapril in the setting of hypotension  #HLD  - Continue Atorvastatin 20 mg daily    Pulmonology  - CXR shows clear lungs  - POCUS shows b/i b lines  - Saturating well on room air, protecting airway    GI  RICHY  # Transaminitis   , , Alk phos and Bili normal   - unknown etiology  - f/u hepatitis panel  - RUQ US    Diet: NPO   - continue to keep NPO until anion gap closes  - bowel regimen        Renal  #Anion gap metabolic acidosis  #Lactic acidosis  #? Ketoacidosis  - S/p 3L IVF  - Monitor VBGs q 12  - Treat underlying cause, possible Euglycemic DKA vs Sepsis   - hyperlactatemia likely iso renal/liver dysfunction metformin vs urosepsis, continue to trend  #LUISA  - Cr from 11/2023 of 0.39  - Admission Cr of .89  - LUISA likely pre-renal in the setting of shock, now slightly downtrending  - Urine electrolytes, UA, Serum osmolality  - Monitor UOP and Cr  - Adequate fluid hydration    ID  #Septic shock in the setting of URI vs UTI    - Complained of burning on urination on 2/15, No CVA tenderness   - leukocytosis 15, Tmax 100.6F  - UA positive, f/u urine culture  - f/u blood culture  - s/p Azithromyin x1, continue Ceftriaxone 1g daily for 14 days (adjust as per cultures)   - RVP negative      Endo  History of type DM2 on Jardiance, now presenting with infection, polyuria, polydipsia  Home regimen - jardiance and janumet  #Euglycemic DKA  #DM2  A1c 6.9%  ED: pH 7.27 AG 20, Bicarb 15, Glucose 201, Lactate 4  - s/p 4 L IVF in ED  - transitioned off insulin with 6 units Lantus (2/20)  - remains hyperglycemic, blood glucose 200-300's  - can start premeal insulin with admelog 3 units  - Increase lantus to 10 units daily  - continue low admelog correction scales before meals and bedtime  - FSG goal 140-180mg/dL while in MICU    Endocrine following  Discharge plan:  -follow up with outpatient endocrinologist Dr. Ch  -stop jardiance on discharge given urosepsis and EDKA. can consider resuming in future as outpt  -Will need to trend lactate closer to discharge to see if metformin can be safely resumed ( had decreased to 1.4, then increased to 2.1), if so then may resume janumet 50-1000mg BID, may need additional agent as well depending on blood glucose values      Heme/Onc  - Continue Warfarin with INR goal of 2.5-3.5   - INR 5.4 today, hgb stable, continue to monitor  - daily coags    Ethics  Full Code  - Family at bedside, discussed plan   
Assessment: 65 F PMH Mitral regurgitation, rheumatic heart disease, atrial fibrillation, HTN, on Digoxin, Warfarin, DM 2 on Jardiance presenting with flu like symptoms, tachycardia, and hypotension requiring pressure support. Likely mutlifactorial shock, primarily septic with component of cardiogenic.     Neuro:  Alert and orientated x3 at baseline  No active issues  - currently at AOx3, following commands  - activity- oob to chair    Cardiovascular:   #Atrial fibrillation with RVR  #Mitral valvue regurgitation  #Mechanical aortic valve replacement  #History of rheumatic heart disease  #Shock, Septic vs cardiogenic  - required pressors likely iso shock, Josep gtt weaned off since 12am 2/20  - Remained Afib -170's w/o hypotension despite initiating and uptrending home PO lopressor   - s/p Lopressor IVP PRN- no change in HR/BP, however c/o "not feeling well" and diaphoretic soon after administered IVP lopressor likely 2/2 to decompensated heart failure? lopressor PO was held, however restarted today and increased to  50mg TID  - cards consult- f/u recs  - s/p Amio bolus (2/20)  continue Amiodarone gtt x 24 hours, then PO amio load  - TTE 2/20  LV systolic function is severely decreased with EF 37 % a change from previous echo 11/23   - Continue Warfarin, INR goal 2.5-3.5   - c/w digoxin every other day  - Digoxin by level, draw 2/23    #HTN  - Hold home Enalapril in the setting of hypotension    #HLD  - Atorvastatin 20 mg daily held iso elevated LFT's    Pulmonology  - CXR shows clear lungs  - Saturating well on room air, protecting airway  - POCUS 2/20 with b lines throughout b/l likely fluid overload   - s/p lasix 40mg IV       GI  RICHY  # Transaminitis   , , Alk phos and Bili normal   - unknown etiology  - hepatitis panel neg  - RUQ US 2/20 w/findings suggestive of steatosis, cholelithiasis, GB nonspecific mural thickening  - continue to trend    Diet: consistent carb  - tolerating diet  - bowel regimen  -  2/21- loose stools  - f/u GI pcr , c-fiff    Renal  #Anion gap metabolic acidosis  #Lactic acidosis  #Ketoacidosis  - Treat underlying cause, possible Euglycemic DKA vs Sepsis  - hyperlactatemia likely iso renal/liver dysfunction metformin vs urosepsis, continue to trend  - S/p 3L IVF   - Monitor VBGs q 12      #LUISA  - Cr from 11/2023 of 0.39  - Admission Cr of .89  - LUISA likely pre-renal in the setting of shock, now slightly downtrending  - S/P lasix 40mg IVP (2/21)   - diuretics PRN goal to keep net even- slightly negative?  - indwelling catheter placed 2/20   - Monitor UOP and Cr      ID  #Septic shock in the setting of URI vs UTI    - Complained of burning on urination on 2/15, No CVA tenderness   - leukocytosis 15, Tmax 100.6F  - UA positive, urine culture 2/19 grew E coli  - blood culture ngtd  - RVP negative  - s/p Azithromyin x1, Ceftriaxone 1g daily for 14 days (2/19-2/20) broadened to J Carlos (2/20-  )  iso increasing lactate  - consider PAN CT if lactate worsens        Endo  History of type DM2 on Jardiance, now presenting with infection, polyuria, polydipsia  Home regimen - jardiance and janumet  #Euglycemic DKA  #DM2  A1c 6.9%  ED: pH 7.27 AG 20, Bicarb 15, Glucose 201, Lactate 4  - s/p 4 L IVF in ED  - transitioned off insulin with 6 units Lantus (2/20)  - remains hyperglycemic, blood glucose 200-300's  - can start premeal insulin with admelog 3 units  - Increase lantus to 10 units daily  - continue low admelog correction scales before meals and bedtime  - FSG goal 140-180mg/dL while in MICU    Endocrine following recs:  Discharge plan:  -follow up with outpatient endocrinologist Dr. Ch  -stop jardiance on discharge given urosepsis and EDKA. can consider resuming in future as outpt  -Will need to trend lactate closer to discharge to see if metformin can be safely resumed ( had decreased to 1.4, then increased to 2.1), if so then may resume janumet 50-1000mg BID, may need additional agent as well depending on blood glucose values      Heme/Onc  - Continue Warfarin with INR goal of 2.5-3.5   - INR 5.4 today, hgb stable, continue to monitor  - daily coags    Ethics  Full Code  - Family at bedside, discussed plan   
Assessment: 65 F PMH Mitral regurgitation, rheumatic heart disease, atrial fibrillation, HTN, on Digoxin, Warfarin, DM 2 on Jardiance presenting with flu like symptoms, tachycardia, and hypotension requiring pressure support. Likely mutlifactorial shock, primarily septic with component of cardiogenic.     Neuro:  Alert and orientated x3 at baseline  No active issues  - currently at AOx3, following commands  - activity- oob to chair    Cardiovascular:   #Atrial fibrillation with RVR  #Mitral valvue regurgitation  #Mechanical aortic valve replacement  #History of rheumatic heart disease  #Shock, Septic vs cardiogenic  - required pressors likely iso shock, Josep gtt weaned off since 12am 2/20  - Remained Afib -170's w/o hypotension despite initiating and uptrending home PO lopressor   - s/p Lopressor IVP PRN- no change in HR/BP, however c/o "not feeling well" and diaphoretic soon after administered IVP lopressor likely 2/2 to decompensated heart failure? lopressor PO was held, however restarted today and increased to  50mg TID  - cards consult- f/u recs  - s/p Amio bolus (2/20)  continue Amiodarone gtt x 24 hours, then PO amio load  - TTE 2/20  LV systolic function is severely decreased with EF 37 % a change from previous echo 11/23   - Continue Warfarin, INR goal 2.5-3.5   - c/w digoxin every other day  - Digoxin by level, draw 2/23    #HTN  - Hold home Enalapril in the setting of hypotension    #HLD  - Atorvastatin 20 mg daily held iso elevated LFT's    Pulmonology  - CXR shows clear lungs  - Saturating well on room air, protecting airway  - POCUS 2/20 with b lines throughout b/l likely fluid overload   - s/p lasix 40mg IV       GI  RCIHY  # Transaminitis   , , Alk phos and Bili normal   - unknown etiology  - hepatitis panel neg  - RUQ US 2/20 w/findings suggestive of steatosis, cholelithiasis, GB nonspecific mural thickening  - continue to trend    Diet: consistent carb  - tolerating diet  - bowel regimen  -  2/21- loose stools  - f/u GI pcr , c-fiff    Renal  #Anion gap metabolic acidosis  #Lactic acidosis  #Ketoacidosis  - Treat underlying cause, possible Euglycemic DKA vs Sepsis  - hyperlactatemia likely iso renal/liver dysfunction metformin vs urosepsis, continue to trend  - S/p 3L IVF   - Monitor VBGs q 12      #LUISA  - Cr from 11/2023 of 0.39  - Admission Cr of .89  - LUISA likely pre-renal in the setting of shock, now slightly downtrending  - S/P lasix 40mg IVP (2/21)   - diuretics PRN goal to keep net even- slightly negative?  - indwelling catheter placed 2/20   - Monitor UOP and Cr      ID  #Septic shock in the setting of URI vs UTI    - Complained of burning on urination on 2/15, No CVA tenderness   - leukocytosis 15, Tmax 100.6F  - UA positive, urine culture 2/19 grew E coli  - blood culture ngtd  - RVP negative  - s/p Azithromyin x1, Ceftriaxone 1g daily for 14 days (2/19-2/20) broadened to empiric zosyn (2/20-  )  iso increasing lactate  - consider PAN CT if lactate worsens        Endo  History of type DM2 on Jardiance, now presenting with infection, polyuria, polydipsia  Home regimen - jardiance and janumet  #Euglycemic DKA  #DM2  A1c 6.9%  ED: pH 7.27 AG 20, Bicarb 15, Glucose 201, Lactate 4  - s/p 4 L IVF in ED  - transitioned off insulin with 6 units Lantus (2/20)  - remains hyperglycemic, blood glucose 200-300's  - can start premeal insulin with admelog 3 units  - Increase lantus to 10 units daily  - continue low admelog correction scales before meals and bedtime  - FSG goal 140-180mg/dL while in MICU    Endocrine following recs:  Discharge plan:  -follow up with outpatient endocrinologist Dr. Ch  -stop jardiance on discharge given urosepsis and EDKA. can consider resuming in future as outpt  -Will need to trend lactate closer to discharge to see if metformin can be safely resumed ( had decreased to 1.4, then increased to 2.1), if so then may resume janumet 50-1000mg BID, may need additional agent as well depending on blood glucose values      Heme/Onc  - Continue Warfarin with INR goal of 2.5-3.5   - INR 5.4 today, hgb stable, continue to monitor  - daily coags    Ethics  Full Code  - Family at bedside, discussed plan   
What Type Of Note Output Would You Prefer (Optional)?: Standard Output
Hpi Title: Evaluation of Skin Lesions

## 2025-04-07 NOTE — PROCEDURE: EXCISION
Chema Information: Selecting Yes will display possible errors in your note based on the variables you have selected. This validation is only offered as a suggestion for you. PLEASE NOTE THAT THE VALIDATION TEXT WILL BE REMOVED WHEN YOU FINALIZE YOUR NOTE. IF YOU WANT TO FAX A PRELIMINARY NOTE YOU WILL NEED TO TOGGLE THIS TO 'NO' IF YOU DO NOT WANT IT IN YOUR FAXED NOTE.

## 2025-07-28 NOTE — PROCEDURE: REPAIR NOTE
Patient [facility] called and they needed  a sooner appt than August 13th,  Chance is out of medicine,  as of last Friday 7/25/25 was his last dose.    Complex Repair Preamble Text (Leave Blank If You Do Not Want): Extensive wide undermining was performed.